# Patient Record
Sex: FEMALE | Race: WHITE | Employment: FULL TIME | URBAN - METROPOLITAN AREA
[De-identification: names, ages, dates, MRNs, and addresses within clinical notes are randomized per-mention and may not be internally consistent; named-entity substitution may affect disease eponyms.]

---

## 2017-03-30 ENCOUNTER — OFFICE VISIT (OUTPATIENT)
Dept: URGENT CARE | Facility: URGENT CARE | Age: 42
End: 2017-03-30
Payer: COMMERCIAL

## 2017-03-30 VITALS
HEIGHT: 67 IN | WEIGHT: 240.5 LBS | HEART RATE: 90 BPM | OXYGEN SATURATION: 96 % | BODY MASS INDEX: 37.75 KG/M2 | TEMPERATURE: 97.7 F | DIASTOLIC BLOOD PRESSURE: 89 MMHG | SYSTOLIC BLOOD PRESSURE: 141 MMHG

## 2017-03-30 DIAGNOSIS — H10.31 ACUTE BACTERIAL CONJUNCTIVITIS OF RIGHT EYE: Primary | ICD-10-CM

## 2017-03-30 PROCEDURE — 99213 OFFICE O/P EST LOW 20 MIN: CPT

## 2017-03-30 RX ORDER — POLYMYXIN B SULFATE AND TRIMETHOPRIM 1; 10000 MG/ML; [USP'U]/ML
1 SOLUTION OPHTHALMIC EVERY 4 HOURS
Qty: 1 BOTTLE | Refills: 0 | Status: SHIPPED | OUTPATIENT
Start: 2017-03-30 | End: 2017-04-06

## 2017-03-30 NOTE — MR AVS SNAPSHOT
"              After Visit Summary   3/30/2017    Natalee Underwood    MRN: [id number]           Patient Information     Date Of Birth          1975        Visit Information        Provider Department      3/30/2017 6:25 PM [facility] URGENT CARE [facility]        Today's Diagnoses     Acute bacterial conjunctivitis of right eye    -  1       Follow-ups after your visit        Follow-up notes from your care team     Return if symptoms worsen or fail to improve.      Who to contact     If you have questions or need follow up information about today's clinic visit or your schedule please contact [facility] directly at [phone].  Normal or non-critical lab and imaging results will be communicated to you by Layerhart, letter or phone within 4 business days after the clinic has received the results. If you do not hear from us within 7 days, please contact the clinic through Layerhart or phone. If you have a critical or abnormal lab result, we will notify you by phone as soon as possible.  Submit refill requests through Weeleo or call your pharmacy and they will forward the refill request to us. Please allow 3 business days for your refill to be completed.          Additional Information About Your Visit        MyChart Information     Weeleo gives you secure access to your electronic health record. If you see a primary care provider, you can also send messages to your care team and make appointments. If you have questions, please call your primary care clinic.  If you do not have a primary care provider, please call [phone] and they will assist you.        Care EveryWhere ID     This is your Care EveryWhere ID. This could be used by other organizations to access your [facility] medical records  [id number]        Your Vitals Were     Pulse Temperature Height Pulse Oximetry Breastfeeding? BMI (Body Mass Index)    90 97.7  F (36.5  C) (Oral) 5' 6.5\" (1.689 m) 96% No " 38.24 kg/m2       Blood Pressure from Last 3 Encounters:   03/30/17 141/89   10/17/16 116/72   01/13/15 112/76    Weight from Last 3 Encounters:   03/30/17 240 lb 8 oz (109.1 kg)   10/17/16 216 lb (98 kg)   01/13/15 215 lb 12.8 oz (97.9 kg)              Today, you had the following     No orders found for display         Today's Medication Changes          These changes are accurate as of: 3/30/17  7:03 PM.  If you have any questions, ask your nurse or doctor.               Start taking these medicines.        Dose/Directions    trimethoprim-polymyxin b ophthalmic solution   Commonly known as:  POLYTRIM   Used for:  Acute bacterial conjunctivitis of right eye        Dose:  1 drop   Apply 1 drop to eye every 4 hours for 7 days   Quantity:  1 Bottle   Refills:  0            Where to get your medicines      These medications were sent to [facility] [address] [place] [address] & [address]  [address] [address]     Phone:  [phone]     trimethoprim-polymyxin b ophthalmic solution                Primary Care Provider Office Phone # Fax #    Gerald Sommers [phone]       [facility] [address] [place] [address]        Thank you!     Thank you for choosing [facility] URGENT [facility]  for your care. Our goal is always to provide you with excellent care. Hearing back from our patients is one way we can continue to improve our services. Please take a few minutes to complete the written survey that you may receive in the mail after your visit with us. Thank you!             Your Updated Medication List - Protect others around you: Learn how to safely use, store and throw away your medicines at www.disposemymeds.org.          This list is accurate as of: 3/30/17  7:03 PM.  Always use your most recent med list.                   Brand Name Dispense Instructions for use    desoximetasone 0.25 % cream    TOPICORT    30 g     Apply to affected skin sparingly twice a day as needed for rash       GAS-X EXTRA STRENGTH 125 MG Caps   Generic drug:  Simethicone      AS NEEDED       ibuprofen 200 MG tablet    ADVIL/MOTRIN     AS NEEDED       MULTIVITAMIN PO      ONE DAILY       trimethoprim-polymyxin b ophthalmic solution    POLYTRIM    1 Bottle    Apply 1 drop to eye every 4 hours for 7 days       TUMS PO      2 DAILY

## 2017-03-30 NOTE — NURSING NOTE
"Chief Complaint   Patient presents with     Urgent Care     Conjunctivitis     noticed yesterday woke up with crust in R eye, creamy discharge coming from the eye throughout the day, eye pink in color, put eyedrops in eye about 2 hours patient wears contact lenses       Initial /89 (BP Location: Right arm, Patient Position: Chair, Cuff Size: Adult Regular)  Pulse 90  Temp 97.7  F (36.5  C) (Oral)  Ht 5' 6.5\" (1.689 m)  Wt 240 lb 8 oz (109.1 kg)  SpO2 96%  Breastfeeding? No  BMI 38.24 kg/m2 Estimated body mass index is 38.24 kg/(m^2) as calculated from the following:    Height as of this encounter: 5' 6.5\" (1.689 m).    Weight as of this encounter: 240 lb 8 oz (109.1 kg).  Medication Reconciliation: complete     LISA Heart      "

## 2017-03-31 NOTE — PROGRESS NOTES
"SUBJECTIVE:  Chief Complaint:   Chief Complaint   Patient presents with     Urgent Care     Conjunctivitis     noticed yesterday woke up with crust in R eye, creamy discharge coming from the eye throughout the day, eye pink in color, put eyedrops in eye about 2 hours patient wears contact lenses     History of Present Illness:  Natalee Underwood is a 42 year old female who presents complaining of moderate right eye discharge, mattering, redness for 1 day(s).   Associated Signs and Symptoms: cough and nasal drainage  Treatment measures tried include: none  Contact wearer : Yes     Past Medical History:   Diagnosis Date     Allergic rhinitis, cause unspecified      Dermatitis      Pneumonia, organism unspecified      Vitamin D deficiency disease 8/13/2013     Current Outpatient Prescriptions   Medication Sig Dispense Refill     trimethoprim-polymyxin b (POLYTRIM) ophthalmic solution Apply 1 drop to eye every 4 hours for 7 days 1 Bottle 0     desoximetasone (TOPICORT) 0.25 % cream Apply to affected skin sparingly twice a day as needed for rash 30 g 1     MULTIVITAMIN OR ONE DAILY       GAS-X EXTRA STRENGTH 125 MG OR CAPS AS NEEDED       IBUPROFEN 200 MG OR TABS  AS NEEDED       TUMS OR 2 DAILY          ROS:  Review of systems negative except as stated above.    OBJECTIVE:  /89 (BP Location: Right arm, Patient Position: Chair, Cuff Size: Adult Regular)  Pulse 90  Temp 97.7  F (36.5  C) (Oral)  Ht 5' 6.5\" (1.689 m)  Wt 240 lb 8 oz (109.1 kg)  SpO2 96%  Breastfeeding? No  BMI 38.24 kg/m2  General: no acute distress  Eye exam: left eye normal lid, conjunctiva, cornea, pupil and fundus, right eye abnormal findings: conjunctivitis with erythema, discharge and matting noted.    ASSESSMENT:  Bacterial Conjunctivitis    PLAN:  Warm packs for comfort. Polytrim ophthalmic drops-1-2 drops in the affected eye(s) every 4 hours while awake for 3 days.  See orders in epic    "

## 2017-06-02 ENCOUNTER — OFFICE VISIT (OUTPATIENT)
Dept: INTERNAL MEDICINE | Facility: CLINIC | Age: 42
End: 2017-06-02
Payer: COMMERCIAL

## 2017-06-02 VITALS
BODY MASS INDEX: 38.25 KG/M2 | HEIGHT: 67 IN | SYSTOLIC BLOOD PRESSURE: 124 MMHG | DIASTOLIC BLOOD PRESSURE: 80 MMHG | WEIGHT: 243.7 LBS | HEART RATE: 85 BPM | TEMPERATURE: 99.2 F | OXYGEN SATURATION: 98 %

## 2017-06-02 DIAGNOSIS — M54.2 NECK PAIN: ICD-10-CM

## 2017-06-02 DIAGNOSIS — M72.2 PLANTAR FASCIITIS: ICD-10-CM

## 2017-06-02 DIAGNOSIS — L74.0 HEAT RASH: ICD-10-CM

## 2017-06-02 DIAGNOSIS — M25.471 SWOLLEN R ANKLE: Primary | ICD-10-CM

## 2017-06-02 LAB
ALBUMIN SERPL-MCNC: 3.3 G/DL (ref 3.4–5)
ALP SERPL-CCNC: 68 U/L (ref 40–150)
ALT SERPL W P-5'-P-CCNC: 21 U/L (ref 0–50)
ANION GAP SERPL CALCULATED.3IONS-SCNC: 9 MMOL/L (ref 3–14)
AST SERPL W P-5'-P-CCNC: 19 U/L (ref 0–45)
BILIRUB SERPL-MCNC: 0.1 MG/DL (ref 0.2–1.3)
BUN SERPL-MCNC: 15 MG/DL (ref 7–30)
CALCIUM SERPL-MCNC: 9.2 MG/DL (ref 8.5–10.1)
CHLORIDE SERPL-SCNC: 106 MMOL/L (ref 94–109)
CO2 SERPL-SCNC: 24 MMOL/L (ref 20–32)
CREAT SERPL-MCNC: 0.65 MG/DL (ref 0.52–1.04)
D DIMER PPP FEU-MCNC: 0.5 UG/ML FEU (ref 0–0.5)
ERYTHROCYTE [DISTWIDTH] IN BLOOD BY AUTOMATED COUNT: 13.5 % (ref 10–15)
GFR SERPL CREATININE-BSD FRML MDRD: ABNORMAL ML/MIN/1.7M2
GLUCOSE SERPL-MCNC: 95 MG/DL (ref 70–99)
HCT VFR BLD AUTO: 40.3 % (ref 35–47)
HGB BLD-MCNC: 12.8 G/DL (ref 11.7–15.7)
MCH RBC QN AUTO: 29.5 PG (ref 26.5–33)
MCHC RBC AUTO-ENTMCNC: 31.8 G/DL (ref 31.5–36.5)
MCV RBC AUTO: 93 FL (ref 78–100)
PLATELET # BLD AUTO: 257 10E9/L (ref 150–450)
POTASSIUM SERPL-SCNC: 4.2 MMOL/L (ref 3.4–5.3)
PROT SERPL-MCNC: 7.4 G/DL (ref 6.8–8.8)
RBC # BLD AUTO: 4.34 10E12/L (ref 3.8–5.2)
SODIUM SERPL-SCNC: 139 MMOL/L (ref 133–144)
WBC # BLD AUTO: 9.3 10E9/L (ref 4–11)

## 2017-06-02 PROCEDURE — 85379 FIBRIN DEGRADATION QUANT: CPT | Performed by: INTERNAL MEDICINE

## 2017-06-02 PROCEDURE — 36415 COLL VENOUS BLD VENIPUNCTURE: CPT | Performed by: INTERNAL MEDICINE

## 2017-06-02 PROCEDURE — 85027 COMPLETE CBC AUTOMATED: CPT | Performed by: INTERNAL MEDICINE

## 2017-06-02 PROCEDURE — 99214 OFFICE O/P EST MOD 30 MIN: CPT | Performed by: INTERNAL MEDICINE

## 2017-06-02 PROCEDURE — 80053 COMPREHEN METABOLIC PANEL: CPT | Performed by: INTERNAL MEDICINE

## 2017-06-02 RX ORDER — DESOXIMETASONE 2.5 MG/G
CREAM TOPICAL
Qty: 15 G | Refills: 3 | Status: SHIPPED | OUTPATIENT
Start: 2017-06-02 | End: 2017-08-28

## 2017-06-02 NOTE — NURSING NOTE
"Chief Complaint   Patient presents with     Musculoskeletal Problem     x 1 month. Swelling and sharp pain on the R foot.       Initial /80 (BP Location: Left arm, Patient Position: Chair, Cuff Size: Adult Regular)  Pulse 85  Temp 99.2  F (37.3  C) (Oral)  Ht 5' 6.5\" (1.689 m)  Wt 243 lb 11.2 oz (110.5 kg)  LMP 05/12/2017 (Approximate)  SpO2 98%  Breastfeeding? No  BMI 38.74 kg/m2 Estimated body mass index is 38.74 kg/(m^2) as calculated from the following:    Height as of this encounter: 5' 6.5\" (1.689 m).    Weight as of this encounter: 243 lb 11.2 oz (110.5 kg).  Medication Reconciliation: complete       Kaminibose MA      "

## 2017-06-02 NOTE — MR AVS SNAPSHOT
After Visit Summary   6/2/2017    Natalee Underwood    MRN: [id number]           Patient Information     Date Of Birth          1975        Visit Information        Provider Department      6/2/2017 3:30 PM Mary Carmen Arrington MD [facility]        Today's Diagnoses     Heat rash    -  1    Neck pain        Plantar fasciitis        Swollen R ankle          Care Instructions    Labs - please proceed to our first floor laboratory to have these drawn (show them your orange ticket).     Results:    If normal: we will release results in MyChart or send them in the mail. You will not be called for these results.    If abnormal, but non-urgent: we will release results in MyChart or send them in the mail. You will not be called for these results.    If abnormal and urgent: we will call you.    ---    You will be contacted by physical therapy to schedule an appointment for neck pain and plantar fasciitis.                  Follow-ups after your visit        Additional Services     [facility] PT, HAND, AND CHIROPRACTIC REFERRAL       **This order will print in the [facility] Scheduling Office**    Physical Therapy, Hand Therapy and Chiropractic Care are available through:    *[facility]  *[facility]  *[facility]    Call one number to schedule at any of the above locations: [phone].    Your provider has referred you to: Physical Therapy at [facility] or [facility]    Indication/Reason for Referral: Neck Pain and foot pain   Onset of Illness: none  Therapy Orders: Evaluate and Treat  Special Programs: None  Special Request: None    Pavan Browne      Additional Comments for the Therapist or Chiropractor: none    Please be aware that coverage of these services is subject to the terms and limitations of your health insurance plan.  Call member services at your health plan with any benefit or coverage questions.      Please bring the following to  "your appointment:    *Your personal calendar for scheduling future appointments  *Comfortable clothing                  Who to contact     If you have questions or need follow up information about today's clinic visit or your schedule please contact [facility] directly at [phone].  Normal or non-critical lab and imaging results will be communicated to you by MyChart, letter or phone within 4 business days after the clinic has received the results. If you do not hear from us within 7 days, please contact the clinic through Displairhart or phone. If you have a critical or abnormal lab result, we will notify you by phone as soon as possible.  Submit refill requests through CrowdGather or call your pharmacy and they will forward the refill request to us. Please allow 3 business days for your refill to be completed.          Additional Information About Your Visit        MyChart Information     CrowdGather gives you secure access to your electronic health record. If you see a primary care provider, you can also send messages to your care team and make appointments. If you have questions, please call your primary care clinic.  If you do not have a primary care provider, please call [phone] and they will assist you.        Care EveryWhere ID     This is your Care EveryWhere ID. This could be used by other organizations to access your [facility] medical records  [id number]        Your Vitals Were     Pulse Temperature Height Last Period Pulse Oximetry Breastfeeding?    85 99.2  F (37.3  C) (Oral) 5' 6.5\" (1.689 m) 05/12/2017 (Approximate) 98% No    BMI (Body Mass Index)                   38.74 kg/m2            Blood Pressure from Last 3 Encounters:   06/02/17 124/80   03/30/17 141/89   10/17/16 116/72    Weight from Last 3 Encounters:   06/02/17 243 lb 11.2 oz (110.5 kg)   03/30/17 240 lb 8 oz (109.1 kg)   10/17/16 216 lb (98 kg)              We Performed the Following     CBC with platelets     " Comprehensive metabolic panel     D dimer, quantitative     [facility], HAND, AND CHIROPRACTIC REFERRAL          Where to get your medicines      These medications were sent to [facility] [address] [place] [address] [address]  [address] [facility] [address]     Phone:  [phone]     desoximetasone 0.25 % cream          Primary Care Provider Office Phone # Fax #    Gerald Sommers [phone]       [facility] [address]  [facility] [address]        Thank you!     Thank you for choosing [facility]  for your care. Our goal is always to provide you with excellent care. Hearing back from our patients is one way we can continue to improve our services. Please take a few minutes to complete the written survey that you may receive in the mail after your visit with us. Thank you!             Your Updated Medication List - Protect others around you: Learn how to safely use, store and throw away your medicines at www.disposemymeds.org.          This list is accurate as of: 6/2/17  4:03 PM.  Always use your most recent med list.                   Brand Name Dispense Instructions for use    desoximetasone 0.25 % cream    TOPICORT    15 g    Apply to affected skin sparingly twice a day as needed for rash       GAS-X EXTRA STRENGTH 125 MG Caps   Generic drug:  Simethicone      AS NEEDED       ibuprofen 200 MG tablet    ADVIL/MOTRIN     AS NEEDED       MULTIVITAMIN PO      ONE DAILY       TUMS PO      2 DAILY

## 2017-06-02 NOTE — PROGRESS NOTES
"  SUBJECTIVE:                                                      HPI: Natalee Underwood is a pleasant 42 year old female who presents with right ankle swelling:    - started ~1 month ago  - no precipitating incident (trauma, misstep, unusual exertion)  - no associated redness, skin changes, or pain  - stable throughout the day (not worse at night)    - no fevers or chills  - no shortness of breath or cough  - no chest pain or palpitations    - no recent immobilization, hospitalization, or surgeries  - no prior history of right ankle/foot injuries, procedures, or surgeries    PMH significant for right-sided plantar fasciitis  - ongoing for ~8-10 months now  - has been wearing special shoes and performing stretching exercises regularly to no avail  - has also been using NSAIDs for pain with moderate relief    PMH also significant for chronic, bilateral, posterior neck pain  - ongoing for years  - no precipitating trauma or unusual exertion  - dull and achy  - no upper extremity numbness, tingling, or weakness  - pain improves with massage and NSAIDs    Between the chronic neck pain and right-sided plantar fasciitis, she is using ibuprofen, 1-2 tabs, 4x/day chronically.    PMH also significant for obesity.    The medication, allergy, and problem lists have been reviewed and updated as appropriate.     Patient needs refill of topical steroid cream for heat rash.       OBJECTIVE:                                                      /80 (BP Location: Left arm, Patient Position: Chair, Cuff Size: Adult Regular)  Pulse 85  Temp 99.2  F (37.3  C) (Oral)  Ht 5' 6.5\" (1.689 m)  Wt 243 lb 11.2 oz (110.5 kg)  LMP 05/12/2017 (Approximate)  SpO2 98%  Breastfeeding? No  BMI 38.74 kg/m2  Constitutional: well-appearing  Respiratory: normal respiratory effort; clear to auscultation bilaterally  Cardiovascular: regular rate and rhythm; mild non-pitting edema right ankle and dorsum of foot - no redness, skin changes, " or tenderness to palpation  Musculoskeletal: normal gait and station; right ankle with normal range of motion  Psych: normal judgment and insight; normal mood and affect; recent and remote memory intact      ASSESSMENT/PLAN:                                                      (M25.471) Swollen R ankle  (primary encounter diagnosis)  Comment:   - etiology unclear.   - differential includes venous insufficiency (due to weight), DVT, and kidney/liver dysfunction.   - chronic NSAID use may be contributing to fluid retention.    - low suspicion for injury - no trauma, no pain, and normal ROM.  Plan:    - CBC, CMP, and D-dimer.   - cut back/try to eliminate NSAID use.   - if labs unrevealing, recommend leg elevation when able and compression stocking.    (M72.2) Plantar fasciitis  Comment:    - ongoing for 8-10 months.   - no significant improvement with supportive shoes and stretches.  Plan: may benefit from PT evaluation and treatment - referral placed.    (M54.2) Neck pain  Comment: chronic in nature.   Plan: referred to PT for further evaluation and treatment.     (L74.0) Heat rash  Plan: refill of Topicort provided.     The instructions on the AVS were discussed and explained to the patient. Patient expressed understanding of instructions.    Mary Carmen Arrington MD   [place] [address] [place] [address]  T: [phone], F: [phone]

## 2017-06-02 NOTE — PATIENT INSTRUCTIONS
Labs - please proceed to our first floor laboratory to have these drawn (show them your orange ticket).     Results:    If normal: we will release results in MyChart or send them in the mail. You will not be called for these results.    If abnormal, but non-urgent: we will release results in MyChart or send them in the mail. You will not be called for these results.    If abnormal and urgent: we will call you.    ---    You will be contacted by physical therapy to schedule an appointment for neck pain and plantar fasciitis.

## 2017-06-06 ENCOUNTER — THERAPY VISIT (OUTPATIENT)
Dept: PHYSICAL THERAPY | Facility: CLINIC | Age: 42
End: 2017-06-06
Payer: COMMERCIAL

## 2017-06-06 DIAGNOSIS — M72.2 PLANTAR FASCIITIS, RIGHT: Primary | ICD-10-CM

## 2017-06-06 PROCEDURE — 97110 THERAPEUTIC EXERCISES: CPT | Mod: GP | Performed by: PHYSICAL THERAPIST

## 2017-06-06 PROCEDURE — 97161 PT EVAL LOW COMPLEX 20 MIN: CPT | Mod: GP | Performed by: PHYSICAL THERAPIST

## 2017-06-06 NOTE — PROGRESS NOTES
"[facility] Initial Evaluation    Subjective:    Patient is a 42 year old female presenting with rehab right ankle/foot hpi.   Natalee Underwood is a 42 year old female with a right foot condition.  Condition occurred with:  Insidious onset.  Condition occurred: for unknown reasons.  This is a new condition  Onset of right heel pain approximately August 2016 for unknown reasons. She does recall being at a campground and was taking showers on hard mats and noted the bottom of her foot bruised and sore. Saw podiatrist who suggested insoles, good shoes, and stretching. Sx's have been 50% improved since then. Currently complains of pain in heel with first steps in a.m. or after prolonged sitting, walking greater than 15 minutes, and driving (resting heel in car gets painful after 30 minutes). Also notes randomly throughout day while sitting gets ache in bottom of right heel and \"zinging pain\" at medial heel. Occasionally limps when painful. History of multiple bilateral ankle sprains.    Patient reports pain:  Medial calcaneal tuberosity, medial and posterior.    Pain is described as aching and stabbing and is intermittent and reported as 4/10 and 7/10.   Pain is worse in the A.M..  Symptoms are exacerbated by standing and walking and relieved by rest.  Since onset symptoms are unchanged.        General health as reported by patient is good.                  Barriers include:  None as reported by patient.    Red flags:  None as reported by patient.                        Objective:    System    Ankle/Foot Evaluation  ROM:    AROM:    Dorsiflexion:  Left:   10  Right:   8  Plantarflexion:  Left:  68    Right:  68        Great Toe Extension:  Left:  45     Right: 45    Strength is normal.  LIGAMENT TESTING:   Anterior Drawer (ATF) Left: neg       Varus Stress (Calc Fib) Left: neg    Varus Stress (Calc Fib) Right: neg  Valgus Stress (Deltoid) Left: neg    Valgus Stress (Deltoid) Right: " trace        PALPATION: Palpation of ankle: also TTP to medial heel.    Right ankle tenderness present at:   medial calcaneal  Right ankle tenderness not present at:  gastroc/soleus or achilles tendon  EDEMA:     Right ankle edema present at:  medial and lateral        MOBILITY TESTING:             First Ray Left: hypomobile    First Ray Right: hypomobile                                                    General     ROS    Assessment/Plan:      Patient is a 42 year old female with right side ankle complaints.  Signs and sx's consistent with plantar fasciitis.   Patient has the following significant findings with corresponding treatment plan.                Diagnosis 1:  R plantar fasciitis  Pain -  US, manual therapy and home program  Decreased ROM/flexibility - manual therapy and therapeutic exercise  Decreased joint mobility - manual therapy and therapeutic exercise  Edema - self management/home program  Decreased function - therapeutic activities    Therapy Evaluation Codes:   1) History comprised of:   Personal factors that impact the plan of care:      None.    Comorbidity factors that impact the plan of care are:      None.     Medications impacting care: None.  2) Examination of Body Systems comprised of:   Body structures and functions that impact the plan of care:      Ankle.   Activity limitations that impact the plan of care are:      Driving, Standing and Walking.  3) Clinical presentation characteristics are:   Stable/Uncomplicated.  4) Decision-Making    Low complexity using standardized patient assessment instrument and/or measureable assessment of functional outcome.  Cumulative Therapy Evaluation is: Low complexity.    Previous and current functional limitations:  (See Goal Flow Sheet for this information)    Short term and Long term goals: (See Goal Flow Sheet for this information)     Communication ability:  Patient appears to be able to clearly communicate and understand verbal and written  communication and follow directions correctly.  Treatment Explanation - The following has been discussed with the patient:   RX ordered/plan of care  Anticipated outcomes  Possible risks and side effects  This patient would benefit from PT intervention to resume normal activities.   Rehab potential is good.    Frequency:  1 X week, once daily  Duration:  for 6 weeks  Discharge Plan:  Achieve all LTG.  Independent in home treatment program.  Reach maximal therapeutic benefit.    Please refer to the daily flowsheet for treatment today, total treatment time and time spent performing 1:1 timed codes.

## 2017-06-07 NOTE — PROGRESS NOTES
Subjective:    Patient is a 42 year old female presenting with rehab left ankle/foot hpi.                                      Pertinent medical history includes:  Osteoarthritis and overweight.    Other surgeries include:  Other (Dental).  Current medications:  Pain medication.  Current occupation is [employer].    Primary job tasks include:  Prolonged sitting and other (computer work).                                Objective:    System    Physical Exam    General     ROS    Assessment/Plan:

## 2017-06-15 ENCOUNTER — THERAPY VISIT (OUTPATIENT)
Dept: PHYSICAL THERAPY | Facility: CLINIC | Age: 42
End: 2017-06-15
Payer: COMMERCIAL

## 2017-06-15 DIAGNOSIS — M72.2 PLANTAR FASCIITIS, RIGHT: ICD-10-CM

## 2017-06-15 PROCEDURE — 97530 THERAPEUTIC ACTIVITIES: CPT | Mod: GP | Performed by: PHYSICAL THERAPIST

## 2017-06-15 PROCEDURE — 97110 THERAPEUTIC EXERCISES: CPT | Mod: GP | Performed by: PHYSICAL THERAPIST

## 2017-06-29 ENCOUNTER — THERAPY VISIT (OUTPATIENT)
Dept: PHYSICAL THERAPY | Facility: CLINIC | Age: 42
End: 2017-06-29
Payer: COMMERCIAL

## 2017-06-29 DIAGNOSIS — M72.2 PLANTAR FASCIITIS, RIGHT: ICD-10-CM

## 2017-06-29 PROCEDURE — 97110 THERAPEUTIC EXERCISES: CPT | Mod: GP | Performed by: PHYSICAL THERAPIST

## 2017-06-29 PROCEDURE — 97140 MANUAL THERAPY 1/> REGIONS: CPT | Mod: GP | Performed by: PHYSICAL THERAPIST

## 2017-07-06 ENCOUNTER — THERAPY VISIT (OUTPATIENT)
Dept: PHYSICAL THERAPY | Facility: CLINIC | Age: 42
End: 2017-07-06
Payer: COMMERCIAL

## 2017-07-06 DIAGNOSIS — M72.2 PLANTAR FASCIITIS, RIGHT: ICD-10-CM

## 2017-07-06 PROCEDURE — 97140 MANUAL THERAPY 1/> REGIONS: CPT | Mod: GP | Performed by: PHYSICAL THERAPIST

## 2017-07-06 PROCEDURE — 97035 APP MDLTY 1+ULTRASOUND EA 15: CPT | Mod: GP | Performed by: PHYSICAL THERAPIST

## 2017-07-06 PROCEDURE — 97110 THERAPEUTIC EXERCISES: CPT | Mod: GP | Performed by: PHYSICAL THERAPIST

## 2017-07-13 ENCOUNTER — THERAPY VISIT (OUTPATIENT)
Dept: PHYSICAL THERAPY | Facility: CLINIC | Age: 42
End: 2017-07-13
Payer: COMMERCIAL

## 2017-07-13 DIAGNOSIS — M54.2 CERVICALGIA: Primary | ICD-10-CM

## 2017-07-13 PROCEDURE — 97161 PT EVAL LOW COMPLEX 20 MIN: CPT | Mod: GP | Performed by: PHYSICAL THERAPIST

## 2017-07-13 PROCEDURE — 97110 THERAPEUTIC EXERCISES: CPT | Mod: GP | Performed by: PHYSICAL THERAPIST

## 2017-07-13 PROCEDURE — 97112 NEUROMUSCULAR REEDUCATION: CPT | Mod: GP | Performed by: PHYSICAL THERAPIST

## 2017-07-13 NOTE — MR AVS SNAPSHOT
After Visit Summary   7/13/2017    Natalee Underwood    MRN: [id number]           Patient Information     Date Of Birth          1975        Visit Information        Provider Department      7/13/2017 5:20 PM Jimena Solano PT [facility] Physical Therapy        Today's Diagnoses     Cervicalgia    -  1       Follow-ups after your visit        Your next 10 appointments already scheduled     Jul 20, 2017  4:40 PM CDT   JACQUI Extremity with Jimena Solano PT   [facility] ([facility]  )    [address]  [facility] [address]   [phone]            Aug 03, 2017  5:20 PM CDT   JACQUI Extremity with Jimena Solano PT   [facility] ([facility]  )    [address]  [facility] [id number] [phone]              Who to contact     If you have questions or need follow up information about today's clinic visit or your schedule please contact [facility] PHYSICAL THERAPY directly at [phone].  Normal or non-critical lab and imaging results will be communicated to you by VoIPshield Systemshart, letter or phone within 4 business days after the clinic has received the results. If you do not hear from us within 7 days, please contact the clinic through VoIPshield Systemshart or phone. If you have a critical or abnormal lab result, we will notify you by phone as soon as possible.  Submit refill requests through SiTime or call your pharmacy and they will forward the refill request to us. Please allow 3 business days for your refill to be completed.          Additional Information About Your Visit        MyChart Information     SiTime gives you secure access to your electronic health record. If you see a primary care provider, you can also send messages to your care team and make appointments. If you have questions,  please call your primary care clinic.  If you do not have a primary care provider, please call [phone] and they will assist you.        Care EveryWhere ID     This is your Care EveryWhere ID. This could be used by other organizations to access your [place] medical records  [id number]         Blood Pressure from Last 3 Encounters:   06/02/17 124/80   03/30/17 141/89   10/17/16 116/72    Weight from Last 3 Encounters:   06/02/17 110.5 kg (243 lb 11.2 oz)   03/30/17 109.1 kg (240 lb 8 oz)   10/17/16 98 kg (216 lb)              We Performed the Following     HC PT EVAL, LOW COMPLEXITY     JACQUI INITIAL EVAL REPORT     NEUROMUSCULAR RE-EDUCATION     THERAPEUTIC EXERCISES        Primary Care Provider Office Phone # Fax #    Gerald Sommers [phone]       [facility] [address] [facility] [address]        Equal Access to Services     KATE MAYA : Hadii aad ku hadasho Soomaali, waaxda luqadaha, qaybta kaalmada adeegyada, waxay idiin haydeidren shant garcia . So [facility] [phone].    ATENCIÓN: Si essence hardin, tiene a lópez disposición servicios gratuitos de asistencia lingüística. Llame al [phone].    We comply with applicable federal civil rights laws and [place] laws. We do not discriminate on the basis of race, color, national origin, age, disability sex, sexual orientation or gender identity.            Thank you!     Thank you for choosing [facility] PHYSICAL THERAP[facility]  for your care. Our goal is always to provide you with excellent care. Hearing back from our patients is one way we can continue to improve our services. Please take a few minutes to complete the written survey that you may receive in the mail after your visit with us. Thank you!             Your Updated Medication List - Protect others around you: Learn how to safely use, store and throw away your medicines at www.disposemymeds.org.          This list is accurate as of:  7/13/17  6:17 PM.  Always use your most recent med list.                   Brand Name Dispense Instructions for use Diagnosis    desoximetasone 0.25 % cream    TOPICORT    15 g    Apply to affected skin sparingly twice a day as needed for rash    Heat rash       GAS-X EXTRA STRENGTH 125 MG Caps   Generic drug:  Simethicone      AS NEEDED        ibuprofen 200 MG tablet    ADVIL/MOTRIN     AS NEEDED        MULTIVITAMIN PO      ONE DAILY        TUMS PO      2 DAILY

## 2017-07-13 NOTE — PROGRESS NOTES
[facility] Initial Evaluation    Subjective:    Patient is a 42 year old female presenting with rehab cervical spine hpi.   Natalee Underwood is a 42 year old female with a cervical spine condition.  Condition occurred with:  Repetition/overuse.  Condition occurred: at home and at work.  This is a new condition  Has had B neck pain over past year but since approximately May 2017 she has been doing a special project at work doing more deskwork/computer work and has had increased sx's. She takes aspirin several times per day for the neck pain. Has a good ergonomic station at work but has had to use a laptop on a desk recently which makes her worse. Also looking back and forth in a conference room is aggravating.    Patient reports pain:  Cervical right side and cervical left side.  Radiates to:  Shoulder right and shoulder left.  Pain is described as aching and burning and is intermittent and reported as 4/10.   Pain is worse in the P.M..  Symptoms are exacerbated by looking up or down and rotating head and relieved by NSAID's.  Since onset symptoms are unchanged.        General health as reported by patient is good.                  Barriers include:  None as reported by the patient.    Red flags:  None as reported by the patient.                        Objective:    Standing Alignment:    Cervical/Thoracic:  Forward head  Shoulder/UE:  Rounded shoulders                                  Cervical/Thoracic Evaluation    AROM:  AROM Cervical:    Flexion:            100% (pulling)  Extension:       75%  Rotation:         Left: 80%     Right: 80%  Side Bend:      Left: 90%     Right:  100%      Headaches: none  Cervical Myotomes:  not assessed                  DTR's:  not assessed          Cervical Dermatomes:  not assessed                                                                      Randy Cervical Evaluation    Posture:  Sitting: fair  Standing: fair  Protruding Head: yes  Wry Neck:  "no  Correction of Posture: no effect    Movement Loss:    Flexion (Flex): nil  Retraction (RET): min  Extension (EXT): min  Lateral Flexion Right (LF R): nil  Lateral Flexion Left (LF L): min  Rotation Right (ROT R): min  Rotation Left (ROT L): min  Test Movements:      RET: During: no effect    Repeat RET: During: no effect  After: better  Mechanical Response: IncROM  RET EXT: During: no effect    Repeat RET EXT: During: no effect  After: no effect  Mechanical Response: IncROM                      Static Tests:      Retraction: 45\"x2--NE    Conclusion: derangement  Principle of Treatment:  Posture Correction: yes    Extension: retraction x10, last 3 reps with extension                                             ROS    Assessment/Plan:      Patient is a 42 year old female with cervical complaints.  She has poor to fair seated posture with FH, rounded shoulders and Dowager's Hump. She had increased motion and slight decreased sx's with repeated cervical retraction. No change in sx's with posture correction. Trial of retr/ext and retr/ext/rot did not change motion or sx's further. Discussed posture and work station, break up sitting.     Patient has the following significant findings with corresponding treatment plan.                Diagnosis 1:  B neck pain  Pain -  directional preference exercise  Decreased ROM/flexibility - manual therapy and therapeutic exercise  Decreased function - therapeutic activities  Impaired posture - neuro re-education    Therapy Evaluation Codes:   1) History comprised of:   Personal factors that impact the plan of care:      None.    Comorbidity factors that impact the plan of care are:      None.     Medications impacting care: None.  2) Examination of Body Systems comprised of:   Body structures and functions that impact the plan of care:      Cervical spine.   Activity limitations that impact the plan of care are:      Reading/Computer work.  3) Clinical presentation characteristics " are:   Stable/Uncomplicated.  4) Decision-Making    Low complexity using standardized patient assessment instrument and/or measureable assessment of functional outcome.  Cumulative Therapy Evaluation is: Low complexity.    Previous and current functional limitations:  (See Goal Flow Sheet for this information)    Short term and Long term goals: (See Goal Flow Sheet for this information)     Communication ability:  Patient appears to be able to clearly communicate and understand verbal and written communication and follow directions correctly.  Treatment Explanation - The following has been discussed with the patient:   RX ordered/plan of care  Anticipated outcomes  Possible risks and side effects  This patient would benefit from PT intervention to resume normal activities.   Rehab potential is good.    Frequency:  1 X week, once daily  Duration:  for 6 weeks  Discharge Plan:  Achieve all LTG.  Independent in home treatment program.  Reach maximal therapeutic benefit.    Please refer to the daily flowsheet for treatment today, total treatment time and time spent performing 1:1 timed codes.

## 2017-07-20 ENCOUNTER — THERAPY VISIT (OUTPATIENT)
Dept: PHYSICAL THERAPY | Facility: CLINIC | Age: 42
End: 2017-07-20
Payer: COMMERCIAL

## 2017-07-20 DIAGNOSIS — M54.2 CERVICALGIA: ICD-10-CM

## 2017-07-20 PROCEDURE — 97110 THERAPEUTIC EXERCISES: CPT | Mod: GP | Performed by: PHYSICAL THERAPIST

## 2017-07-20 PROCEDURE — 97112 NEUROMUSCULAR REEDUCATION: CPT | Mod: GP | Performed by: PHYSICAL THERAPIST

## 2017-07-22 ENCOUNTER — HEALTH MAINTENANCE LETTER (OUTPATIENT)
Age: 42
End: 2017-07-22

## 2017-08-03 ENCOUNTER — THERAPY VISIT (OUTPATIENT)
Dept: PHYSICAL THERAPY | Facility: CLINIC | Age: 42
End: 2017-08-03
Payer: COMMERCIAL

## 2017-08-03 DIAGNOSIS — M54.2 CERVICALGIA: ICD-10-CM

## 2017-08-03 PROCEDURE — 97112 NEUROMUSCULAR REEDUCATION: CPT | Mod: GP | Performed by: PHYSICAL THERAPIST

## 2017-08-03 PROCEDURE — 97110 THERAPEUTIC EXERCISES: CPT | Mod: GP | Performed by: PHYSICAL THERAPIST

## 2017-08-10 ENCOUNTER — THERAPY VISIT (OUTPATIENT)
Dept: PHYSICAL THERAPY | Facility: CLINIC | Age: 42
End: 2017-08-10
Payer: COMMERCIAL

## 2017-08-10 DIAGNOSIS — M54.2 CERVICALGIA: ICD-10-CM

## 2017-08-10 PROCEDURE — 97110 THERAPEUTIC EXERCISES: CPT | Mod: GP | Performed by: PHYSICAL THERAPIST

## 2017-08-10 PROCEDURE — 97112 NEUROMUSCULAR REEDUCATION: CPT | Mod: GP | Performed by: PHYSICAL THERAPIST

## 2017-08-28 ENCOUNTER — OFFICE VISIT (OUTPATIENT)
Dept: INTERNAL MEDICINE | Facility: CLINIC | Age: 42
End: 2017-08-28
Payer: COMMERCIAL

## 2017-08-28 VITALS
DIASTOLIC BLOOD PRESSURE: 80 MMHG | OXYGEN SATURATION: 100 % | BODY MASS INDEX: 36.43 KG/M2 | HEART RATE: 90 BPM | TEMPERATURE: 99.1 F | HEIGHT: 67 IN | WEIGHT: 232.1 LBS | SYSTOLIC BLOOD PRESSURE: 118 MMHG

## 2017-08-28 DIAGNOSIS — Z12.4 SCREENING FOR CERVICAL CANCER: ICD-10-CM

## 2017-08-28 DIAGNOSIS — M79.641 BILATERAL HAND PAIN: ICD-10-CM

## 2017-08-28 DIAGNOSIS — Z00.01 ENCOUNTER FOR ROUTINE ADULT MEDICAL EXAM WITH ABNORMAL FINDINGS: Primary | ICD-10-CM

## 2017-08-28 DIAGNOSIS — Z71.84 TRAVEL ADVICE ENCOUNTER: ICD-10-CM

## 2017-08-28 DIAGNOSIS — M79.642 BILATERAL HAND PAIN: ICD-10-CM

## 2017-08-28 PROCEDURE — G0145 SCR C/V CYTO,THINLAYER,RESCR: HCPCS | Performed by: INTERNAL MEDICINE

## 2017-08-28 PROCEDURE — 87624 HPV HI-RISK TYP POOLED RSLT: CPT | Performed by: INTERNAL MEDICINE

## 2017-08-28 PROCEDURE — 99396 PREV VISIT EST AGE 40-64: CPT | Performed by: INTERNAL MEDICINE

## 2017-08-28 PROCEDURE — 99212 OFFICE O/P EST SF 10 MIN: CPT | Mod: 25 | Performed by: INTERNAL MEDICINE

## 2017-08-28 RX ORDER — CIPROFLOXACIN 500 MG/1
500 TABLET, FILM COATED ORAL 2 TIMES DAILY
Qty: 6 TABLET | Refills: 0 | Status: SHIPPED | OUTPATIENT
Start: 2017-08-28

## 2017-08-28 NOTE — PROGRESS NOTES
SUBJECTIVE:                                                      HPI: Natalee Underwood is a pleasant 42 year old female who presents for a physical.    She is planning on going to [place] in December. Will be staying with family and friends for 7-10 days.  - routine immunizations are up-to-date  - hepatitis A series is complete  - Typhoid vaccination is recommended  - she will not be in an area at risk for malaria  - patient made aware that she is at risk for his Zika virus while in [place]   - she is not pregnant, sexually active, or in relationship   - encouraged to wear insect repellent when outdoors while there  - patient not at risk for hepatitis B or rabies  - prescription for traveler's diarrhea recommended   - patient encouraged to avoid drinking tap water or eating undercooked food, and street fare    Patient also requests referral for hand therapy due to bilateral hand pain - chronic in nature.    ROS:  Constitutional: denies unintentional weight loss or gain; denies fevers, chills, or sweats     Cardiovascular: denies chest pain, palpitations, or edema  Respiratory: denies cough, wheezing, shortness of breath, or dyspnea on exertion  Gastrointestinal: denies nausea, vomiting, constipation, diarrhea, or abdominal pain  Genitourinary: denies urinary frequency, urgency, dysuria, or hematuria  Integumentary: denies rash or pruritus  Musculoskeletal: see above; denies back pain, muscle pain, joint pain, or joint swelling  Neurologic: denies focal weakness, numbness, or tingling  Hematologic/Immunologic: denies history of anemia or blood transfusions  Endocrine: denies heat or cold intolerance; denies polyuria, polydipsia  Psychiatric: denies anxiety; see preventative health below    Past Medical History:   Diagnosis Date     Obesity (BMI 30-39.9)      Past Surgical History:   Procedure Laterality Date     PLACEMENT OF DENTAL IMPLANT(S)      1 upper, 1 lower     Family History   Problem Relation Age of  "Onset     Rheumatologic Disease Mother      FM     Type 2 Diabetes Father      Myocardial Infarction Maternal Grandfather      later in life     Type 2 Diabetes Paternal Grandfather      Prostate Cancer Paternal Grandfather      CEREBROVASCULAR DISEASE No family hx of      Coronary Artery Disease Early Onset No family hx of      Colon Cancer No family hx of      Breast Cancer No family hx of      Ovarian Cancer No family hx of      Social History     Social History     Marital status: Single     Spouse name: N/A     Number of children: 0     Years of education: N/A     Occupational History     Configuration Management      Social History Main Topics     Smoking status: Never Smoker     Smokeless tobacco: Never Used     Alcohol use No     Drug use: No     Sexual activity: No      Comment: has never been sexually active     Social History Narrative    Single. Living with family.    No kids.     Walks most days; Aquafit once/week.      Allergies   Allergen Reactions     No Known Drug Allergies      Current Outpatient Prescriptions   Medication Sig     ASPIRIN PO Take 325 mg by mouth     MULTIVITAMIN OR ONE DAILY     TUMS OR 2 DAILY     Immunization History   Administered Date(s) Administered     TD (ADULT, 7+) 10/10/2002     TDAP Vaccine (Adacel) 08/27/2009     Twinrix A/B 12/09/2004, 01/11/2005, 06/30/2005     OBJECTIVE:                                                      /80  Pulse 90  Temp 99.1  F (37.3  C) (Oral)  Ht 5' 6.5\" (1.689 m)  Wt 232 lb 1.6 oz (105.3 kg)  LMP 08/20/2017  SpO2 100%  BMI 36.9 kg/m2  Constitutional: well-appearing  Eyes: normal conjunctivae and lids; pupils equal, round, and reactive to light  Ears, Nose, Mouth, and Throat: normal ears and nose; tympanic membranes visualized and normal; normal lips, teeth, and gums; no oropharyngeal lesions or ulcers  Neck: supple and symmetric; no lymphadenopathy; no thyromegaly or masses  Respiratory: normal respiratory effort; clear to " auscultation bilaterally  Cardiovascular: regular rate and rhythm; pedal pulses palpable; no edema  Breasts: normal appearance; no masses or skin retraction; no nipple discharge or bleeding; no axillary lymphadenopathy  Gastrointestinal: soft, non-tender, non-distended, and bowel sounds present; no organomegaly or masses  Genitourinary: external genitalia, urethral meatus, and vagina normal; cervix visualized and normal in appearance  Musculoskeletal: normal gait and station  Psych: normal judgment and insight; normal mood and affect; recent and remote memory intact; oriented to time, place, and person    PREVENTATIVE HEALTH                                                      Blood pressure: within normal limits   Mammogram: patient would like to start screening mammograms at age 46  Pap: DUE  Colonoscopy: not medically indicated at this time   Dexa: not medically indicated at this time   Screening HCV: not medically indicated at this time   Screening cholesterol: up to date and within normal limits (LDL: 106 , HDL: 48, T)   Screening diabetes: up to date and within normal limits (77, 3/7/17)  STD testing: no risk factors present  Depression screening: PHQ-2 assessment completed and reviewed - no intervention indicated at this time  Alcohol misuse screening: alcohol use reviewed - no intervention indicated at this time  Immunizations: reviewed; up to date     ASSESSMENT/PLAN:                                                       (Z00.01) Encounter for routine adult medical exam with abnormal findings  (primary encounter diagnosis)  Comment: PMH, PSH, FH, SH, medications, allergies, immunizations, and preventative health measures reviewed.   Plan: see below for plans    (Z12.4) Screening for cervical cancer  Plan: pap smear obtained today.    (Z71.89) Travel advice encounter  Comment:    - going to [place] in December for 7-10 days; will be staying with friends and family.    - immunizations, including Hep A,  up to date, except Typhoid.   - will not be at risk for malaria, Hep B, or rabies.   Plan:   - prescription for oral typhoid provided.   - prescription for Cipro provided.   - patient encouraged to wear insect repellent when outdoors in [place].    - patient encouraged to avoid drinking tap water or eating undercooked food, and street fare.    (M79.641,  M79.642) Bilateral hand pain  Comment: did not evaluate.   Plan: PT referral placed - patient will be contacted to schedule.    The instructions on the AVS were discussed and explained to the patient. Patient expressed understanding of instructions.    (Chart documentation was completed, in part, with hulu voice-recognition software. Even though reviewed, some grammatical, spelling, and word errors may remain.)    Mary Carmen Arrington MD   [address] [address]  T: [phone], F: [phone]

## 2017-08-28 NOTE — PATIENT INSTRUCTIONS
Pap smear results take ~ 1 week to come back.    ---    Printed scripts for ciprofloxacin and typhoid.    Start oral typhoid vaccine 2 weeks prior to travel, 1 capsule every other day × 4 doses.    In case of traveler's diarrhea: Ciprofloxacin 500 mg twice a day × 3 days.    ---    You have been referred to hand therapy - they will contact you to schedule.

## 2017-08-28 NOTE — MR AVS SNAPSHOT
After Visit Summary   8/28/2017    Natalee Underwood    MRN: [id number]           Patient Information     Date Of Birth          1975        Visit Information        Provider Department      8/28/2017 9:00 AM Mary Carmen Arrington MD [facility]        Today's Diagnoses     Travel advice encounter    -  1    Bilateral hand pain        Screening for cervical cancer          Care Instructions    Pap smear results take ~ 1 week to come back.    ---    Printed scripts for ciprofloxacin and typhoid.    Start oral typhoid vaccine 2 weeks prior to travel, 1 capsule every other day × 4 doses.    In case of traveler's diarrhea: Ciprofloxacin 500 mg twice a day × 3 days.    ---    You have been referred to hand therapy - they will contact you to schedule.          Follow-ups after your visit        Additional Services     [facility] PT, HAND, AND CHIROPRACTIC REFERRAL       **This order will print in the JACQUI Scheduling Office**    Physical Therapy, Hand Therapy and Chiropractic Care are available through:    *[facility]  *[facility]  *[facility]    Call one number to schedule at any of the above locations: [phone].    Your provider has referred you to: Hand Therapy    Indication/Reason for Referral: Hand Pain  Onset of Illness: chronic  Therapy Orders: Evaluate and Treat  Special Programs: None  Special Request: None    Pavan Browne      Additional Comments for the Therapist or Chiropractor: none    Please be aware that coverage of these services is subject to the terms and limitations of your health insurance plan.  Call member services at your health plan with any benefit or coverage questions.      Please bring the following to your appointment:    *Your personal calendar for scheduling future appointments  *Comfortable clothing                  Your next 10 appointments already scheduled     Sep 05, 2017  8:50 AM CDT  "  JACQUI Extremity with Jimena Solano PT   [facility] Therapy ([facility] [place]  )    [address] [place] [address] [phone]              Who to contact     If you have questions or need follow up information about today's clinic visit or your schedule please contact [facility] directly at [phone].  Normal or non-critical lab and imaging results will be communicated to you by Yasthart, letter or phone within 4 business days after the clinic has received the results. If you do not hear from us within 7 days, please contact the clinic through WOWasht or phone. If you have a critical or abnormal lab result, we will notify you by phone as soon as possible.  Submit refill requests through Sanergy or call your pharmacy and they will forward the refill request to us. Please allow 3 business days for your refill to be completed.          Additional Information About Your Visit        Sanergy Information     Sanergy gives you secure access to your electronic health record. If you see a primary care provider, you can also send messages to your care team and make appointments. If you have questions, please call your primary care clinic.  If you do not have a primary care provider, please call [phone] and they will assist you.        Care EveryWhere ID     This is your Care EveryWhere ID. This could be used by other organizations to access your [facility] medical records  [id number]        Your Vitals Were     Pulse Temperature Height Last Period Pulse Oximetry BMI (Body Mass Index)    90 99.1  F (37.3  C) (Oral) 5' 6.5\" (1.689 m) 08/20/2017 100% 36.9 kg/m2       Blood Pressure from Last 3 Encounters:   08/28/17 118/80   06/02/17 124/80   03/30/17 141/89    Weight from Last 3 Encounters:   08/28/17 232 lb 1.6 oz (105.3 kg)   06/02/17 243 lb 11.2 oz (110.5 kg)   03/30/17 240 lb 8 oz (109.1 kg)              We Performed the " Following     HPV High Risk Types DNA Cervical     [facility] PT, HAND, AND CHIROPRACTIC REFERRAL     Pap imaged thin layer screen with HPV - recommended age 30 - 65 years (select HPV order below)          Today's Medication Changes          These changes are accurate as of: 8/28/17  9:48 AM.  If you have any questions, ask your nurse or doctor.               Start taking these medicines.        Dose/Directions    ciprofloxacin 500 MG tablet   Commonly known as:  CIPRO   Used for:  Travel advice encounter   Started by:  Mary Carmen Arrington MD        Dose:  500 mg   Take 1 tablet (500 mg) by mouth 2 times daily   Quantity:  6 tablet   Refills:  0       typhoid CR capsule   Commonly known as:  VIVOTIF   Used for:  Travel advice encounter   Started by:  Mary Carmen Arrington MD        Dose:  1 capsule   Take 1 capsule by mouth every other day   Quantity:  4 capsule   Refills:  0            Where to get your medicines      Some of these will need a paper prescription and others can be bought over the counter.  Ask your nurse if you have questions.     Bring a paper prescription for each of these medications     ciprofloxacin 500 MG tablet    typhoid CR capsule                Primary Care Provider Office Phone # Fax #    Mary Carmen Arrington [phone]       [address] [facility] [address]        Equal Access to Services     GONSALO MAYA AH: Hadii rebel benitez hadasho Soomaali, waaxda luqadaha, qaybta kaalmada adeegyada, emelina terry. So [facility] [phone].    ATENCIÓN: Si habla español, tiene a lópez disposición servicios gratuitos de asistencia lingüística. Llame al [phone].    We comply with applicable federal civil rights laws and [place] laws. We do not discriminate on the basis of race, color, national origin, age, disability sex, sexual orientation or gender identity.            Thank you!     Thank you for choosing [facility]  for your care. Our goal is  always to provide you with excellent care. Hearing back from our patients is one way we can continue to improve our services. Please take a few minutes to complete the written survey that you may receive in the mail after your visit with us. Thank you!             Your Updated Medication List - Protect others around you: Learn how to safely use, store and throw away your medicines at www.disposemymeds.org.          This list is accurate as of: 8/28/17  9:48 AM.  Always use your most recent med list.                   Brand Name Dispense Instructions for use Diagnosis    ASPIRIN PO      Take 325 mg by mouth        ciprofloxacin 500 MG tablet    CIPRO    6 tablet    Take 1 tablet (500 mg) by mouth 2 times daily    Travel advice encounter       MULTIVITAMIN PO      ONE DAILY        TUMS PO      2 DAILY        typhoid CR capsule    VIVOTIF    4 capsule    Take 1 capsule by mouth every other day    Travel advice encounter

## 2017-08-28 NOTE — NURSING NOTE
"Chief Complaint   Patient presents with     Physical       Initial /80  Pulse 90  Temp 99.1  F (37.3  C) (Oral)  Ht 5' 6.5\" (1.689 m)  Wt 232 lb 1.6 oz (105.3 kg)  LMP 08/20/2017  SpO2 100%  BMI 36.9 kg/m2 Estimated body mass index is 36.9 kg/(m^2) as calculated from the following:    Height as of this encounter: 5' 6.5\" (1.689 m).    Weight as of this encounter: 232 lb 1.6 oz (105.3 kg).  Medication Reconciliation: complete   Angelica Tejeda MA   "

## 2017-08-30 LAB
COPATH REPORT: NORMAL
PAP: NORMAL

## 2017-08-31 LAB
FINAL DIAGNOSIS: NORMAL
HPV HR 12 DNA CVX QL NAA+PROBE: NEGATIVE
HPV16 DNA SPEC QL NAA+PROBE: NEGATIVE
HPV18 DNA SPEC QL NAA+PROBE: NEGATIVE
SPECIMEN DESCRIPTION: NORMAL

## 2017-09-05 ENCOUNTER — THERAPY VISIT (OUTPATIENT)
Dept: PHYSICAL THERAPY | Facility: CLINIC | Age: 42
End: 2017-09-05
Payer: COMMERCIAL

## 2017-09-05 DIAGNOSIS — M54.2 CERVICALGIA: ICD-10-CM

## 2017-09-05 DIAGNOSIS — M72.2 PLANTAR FASCIITIS, RIGHT: ICD-10-CM

## 2017-09-05 PROCEDURE — 97530 THERAPEUTIC ACTIVITIES: CPT | Mod: GP | Performed by: PHYSICAL THERAPIST

## 2017-09-05 PROCEDURE — 97112 NEUROMUSCULAR REEDUCATION: CPT | Mod: GP | Performed by: PHYSICAL THERAPIST

## 2017-09-05 PROCEDURE — 97110 THERAPEUTIC EXERCISES: CPT | Mod: GP | Performed by: PHYSICAL THERAPIST

## 2017-09-05 NOTE — PROGRESS NOTES
Subjective:    HPI                    Objective:    System    Physical Exam    General     ROS    Assessment/Plan:      DISCHARGE REPORT    Progress reporting period is from 6-6-17 (for foot/ankle; 7-13-17 for neck) to 9-5-17.   Pt seen for 5 visits for foot/ankle, 5 visits for neck.    SUBJECTIVE   Overall the neck is better, less pain and stiffness. Still takes aspirin as needed. Sitting at computer at work can still be bothersome but feels she can manage her sx's with the exercises. Retraction and retr/ext exercise is most helpful. Seems to be best if she can do a couple sets of 10 at a time. Her foot pain is slowly improving. The exercises are helpful and she feels ready to continue with these on her own. She would like to start going to the gym to workout and would like some guidance for this.    Current Pain level: 1/10.      Initial Pain level: 5/10.   Changes in function:  Yes (See Goal flowsheet attached for changes in current functional level)  Adverse reaction to treatment or activity: None    OBJECTIVE  Changes noted in objective findings:  Yes, improved motion, strength, posture, muscle control.  Objective: CAROM is 100% all directions. Able to demonstate good posture without cues. R ankle motion and strength are WNL. Gait is WNL. Has decreased B great toe extension. She will continue to work on that with her HEP. Discussed gym workouts in relation to her neck and foot issues.      ASSESSMENT/PLAN  Patient has a good home exercise program for her neck and foot. We discussed gym workouts, what to avoid, what to focus on in relation to her neck and foot issues. She feels she is managing her sx's with her home exercise program and is ready to continue on her own. She will call with any questions/problems.    Updated problem list and treatment plan: Diagnosis 1:  Neck pain    Diagnosis 2:  Foot/ankle pain     STG/LTGs have been met or progress has been made towards goals:  Yes (See Goal flow sheet completed  today.)  Assessment of Progress: The patient's condition is improving.  Patient is meeting short term goals and is progressing towards long term goals.  Self Management Plans:  Patient has been instructed in a home treatment program.  Patient  has been instructed in self management of symptoms.  I have re-evaluated this patient and find that the nature, scope, duration and intensity of the therapy is appropriate for the medical condition of the patient.  Natalee continues to require the following intervention to meet STG and LTG's:  PT intervention is no longer required to meet STG/LTG.    Recommendations:  This patient is ready to be discharged from therapy and continue their home treatment program.    Please refer to the daily flowsheet for treatment today, total treatment time and time spent performing 1:1 timed codes.

## 2017-09-05 NOTE — MR AVS SNAPSHOT
After Visit Summary   9/5/2017    Natalee Underwood    MRN: [id number]           Patient Information     Date Of Birth          1975        Visit Information        Provider Department      9/5/2017 8:50 AM Jimena Solano PT [facility] Physical Therapy        Today's Diagnoses     Cervicalgia        Plantar fasciitis, right           Follow-ups after your visit        Your next 10 appointments already scheduled     Sep 08, 2017  5:00 PM CDT   (Arrive by 4:45 PM)   [facility]I [facility] Cassandra Moran OT   [facility]    [address] [address]   [phone]            Sep 15, 2017  9:00 AM CDT   JACQUI Hand with Jessie Becker   [facility]    [address] [address]   [phone]            Sep 22, 2017  9:00 AM CDT   JACQUI Hand with Jessie Becker   [facility]    [address] [address]   [phone]              Who to contact     If you have questions or need follow up information about today's clinic visit or your schedule please contact [facility]ER[facility] directly at [phone].  Normal or non-critical lab and imaging results will be communicated to you by [employer], letter or phone within 4 business days after the clinic has received the results. If you do not hear from us within 7 days, please contact the clinic through TableApphart or phone. If you have a critical or abnormal lab result, we will notify you by phone as soon as possible.  Submit refill requests through [facility] [facility] or call your pharmacy and they will forward the refill request to us. Please allow 3 business days for your refill to be completed.          Additional Information About Your Visit        [employer] Information     [employer] [facility] gives you secure access to your electronic health  record. If you see a primary care provider, you can also send messages to your care team and make appointments. If you have questions, please call your primary care clinic.  If you do not have a primary care provider, please call [phone] and they will assist you.        Care EveryWhere ID     This is your Care EveryWhere ID. This could be used by other organizations to access your [place] medical records  [id number]        Your Vitals Were     Last Period                   08/20/2017            Blood Pressure from Last 3 Encounters:   08/28/17 118/80   06/02/17 124/80   03/30/17 141/89    Weight from Last 3 Encounters:   08/28/17 105.3 kg (232 lb 1.6 oz)   06/02/17 110.5 kg (243 lb 11.2 oz)   03/30/17 109.1 kg (240 lb 8 oz)              We Performed the Following     JACQUI PROGRESS NOTES REPORT     NEUROMUSCULAR RE-EDUCATION     THERAPEUTIC ACTIVITIES     THERAPEUTIC EXERCISES        Primary Care Provider Office Phone # Fax #    Mary Carmen Arrington [phone]       [address] [place] [address]        Equal Access to Services     [facility] : Hadii aad ku hadasho Soomaali, waaxda luqadaha, qaybta kaalmada abraham, emelina garcia . So [facility] [phone].    ATENCIÓN: Si habla español, tiene a lópez disposición servicios gratuitos de asistencia lingüística. Lucy al [phone].    We comply with applicable federal civil rights laws and [place] laws. We do not discriminate on the basis of race, color, national origin, age, disability sex, sexual orientation or gender identity.            Thank you!     Thank you for choosing [facility] PH[facility]ICAL THERAP[facility]  for your care. Our goal is always to provide you with excellent care. Hearing back from our patients is one way we can continue to improve our services. Please take a few minutes to complete the written survey that you may receive in the mail after your visit with us. Thank  you!             Your Updated Medication List - Protect others around you: Learn how to safely use, store and throw away your medicines at www.disposemymeds.org.          This list is accurate as of: 9/5/17 11:24 AM.  Always use your most recent med list.                   Brand Name Dispense Instructions for use Diagnosis    ASPIRIN PO      Take 325 mg by mouth        ciprofloxacin 500 MG tablet    CIPRO    6 tablet    Take 1 tablet (500 mg) by mouth 2 times daily    Travel advice encounter       MULTIVITAMIN PO      ONE DAILY        TUMS PO      2 DAILY        typhoid CR capsule    VIVOTIF    4 capsule    Take 1 capsule by mouth every other day    Travel advice encounter

## 2017-09-08 ENCOUNTER — THERAPY VISIT (OUTPATIENT)
Dept: OCCUPATIONAL THERAPY | Facility: CLINIC | Age: 42
End: 2017-09-08
Payer: COMMERCIAL

## 2017-09-08 DIAGNOSIS — M79.641 PAIN IN BOTH HANDS: Primary | ICD-10-CM

## 2017-09-08 DIAGNOSIS — M79.642 PAIN IN BOTH HANDS: Primary | ICD-10-CM

## 2017-09-08 PROCEDURE — 97110 THERAPEUTIC EXERCISES: CPT | Mod: GO | Performed by: OCCUPATIONAL THERAPIST

## 2017-09-08 PROCEDURE — 97535 SELF CARE MNGMENT TRAINING: CPT | Mod: GO | Performed by: OCCUPATIONAL THERAPIST

## 2017-09-08 PROCEDURE — 97165 OT EVAL LOW COMPLEX 30 MIN: CPT | Mod: GO | Performed by: OCCUPATIONAL THERAPIST

## 2017-09-08 NOTE — PROGRESS NOTES
Hand Therapy Initial Evaluation    Current Date:  9/8/2017    Diagnosis: bilateral hand discomfort       Subjective:  Natlaee Underwood is a 42 year old right hand dominant female reporting bilateral hand pain for a few years. Its better now that she has an ergonomic desk set up.    Patient reports symptoms of pain and stiffness/loss of motion of the bilateral hands which occurred due to unknown mostly when playing piano. Since onset symptoms are Unchanged  Special tests:  bone scan.  Previous treatment: none.    General health as reported by patient is good.  Pertinent medical history includes:osteoarthritis, overweight  Medical allergies:none.  Surgical history: none.  Medication history: anti-inflammatory, pain.    Occupational Profile Information:  Current occupation is   Currently working in normal job without restrictions  Job Tasks: prolonged sitting, repetitive tasks, computer work  Prior functional level:  no limitations  Barriers include:none  Mobility: No difficulty  Transportation: drives  Leisure activities/hobbies: playong piano, drums, and writing      Functional Outcome Measure:  UEFI 71/80.  Lower score indicates more diability    Objective:  Pain Level Report  VAS(0-10) 9/8/2017   At Rest: 0/10   With Use: 2/10     Report of Pain:  Location:  hand  Pain Quality:  Dull  Frequency: intermittent    Pain is worst:  After playing piano  Exacerbated by:  Piano playing, writing  Relieved by:  unknown  Progression:  unchanged  ROM:  full    Strength:        INVOLVED:  bilateral  Norm/low end of norm for age  +,++, +++  = Pain  Date  9/8/17     Right Left    55 lbs 46 lbs   Norm 70.4 lbs 62.3lbs       Edema:  NONE of affected part    Scar:  none    Sensation:  WNL throughout all nerve distributions; per patient report     Assessment/Plan:  Patient's limitations or Problem List includes:  Pain and stiffness of the bilateral hand which interferes with the patient's ability to  perform Recreational Activities  as compared to previous level of function.    Rehab Potential:  Excellent - Return to full activity, no limitations    Patient will benefit from skilled Occupational Therapy to decrease stiffness to return to previous activity level and resume normal daily tasks and to reach their rehab potential.    Barriers to Learning:  No barrier    Communication Issues:  Patient appears to be able to clearly communicate and understand verbal and written communication and follow directions correctly.    Chart Review: Brief history including review of medical and/or therapy records relating to the presenting problem    Identified Performance Deficits: playing piano and writing in journal    Assessment of Occupational Performance:  1-3 Performance Deficits    Clinical Decision Making (Complexity): Low complexity    Treatment Explanation:  The following has been discussed with the patient:  RX ordered/plan of care  Anticipated outcomes  Possible risks and side effects    Treatment Plan:    Frequency:  1 x visit  Duration:  NA; 1 x visit    Modalities:  Contrast baths  Therapeutic Exercise:  AROM, Tendon Gliding and Isotonics  Self Care:  Ergonomic Considerations  Discharge Plan:  Achieve all LTG.  Independent in home treatment program.  Reach maximal therapeutic benefit.    Home Exercise Program:  Intrinsic strengthening  Tendon gliding   Stretches  Edema gloves if needed  contrast baths

## 2018-03-07 ENCOUNTER — E-VISIT (OUTPATIENT)
Dept: INTERNAL MEDICINE | Facility: CLINIC | Age: 43
End: 2018-03-07
Payer: COMMERCIAL

## 2018-03-07 DIAGNOSIS — H10.022 PINK EYE DISEASE OF LEFT EYE: Primary | ICD-10-CM

## 2020-02-10 ENCOUNTER — HEALTH MAINTENANCE LETTER (OUTPATIENT)
Age: 45
End: 2020-02-10

## 2021-02-07 ENCOUNTER — HEALTH MAINTENANCE LETTER (OUTPATIENT)
Age: 46
End: 2021-02-07

## 2021-04-04 ENCOUNTER — HEALTH MAINTENANCE LETTER (OUTPATIENT)
Age: 46
End: 2021-04-04

## 2022-03-05 ENCOUNTER — HEALTH MAINTENANCE LETTER (OUTPATIENT)
Age: 47
End: 2022-03-05

## 2022-04-30 ENCOUNTER — HEALTH MAINTENANCE LETTER (OUTPATIENT)
Age: 47
End: 2022-04-30

## 2023-02-04 NOTE — MR AVS SNAPSHOT
After Visit Summary   6/6/2017    Natalee Underwood    MRN: [id number]           Patient Information     Date Of Birth          1975        Visit Information        Provider Department      6/6/2017 5:20 PM Jimena Solano PT [facility] Physical Therapy        Today's Diagnoses     Plantar fasciitis, right    -  1       Follow-ups after your visit        Your next 10 appointments already scheduled     Asher 15, 2017  3:20 PM CDT   JACQUI Extremity with Jimena Solano PT   [facility] Physical Therapy ([facility]  )    [address] [place] [address] [address]  [facility] [address]   [phone]            Jun 29, 2017  5:20 PM CDT   JACQUI Extremity with Jimena Solano PT   [facility] Therapy ([facility]  )    [address] [place] [address] [address]  [facility] [address]   [phone]            Jul 06, 2017  5:20 PM CDT   JACQUI Extremity with Jimena Solano PT   [facility]    [address] [place] [address] [address]  [facility] [address]   [phone]              Who to contact     If you have questions or need follow up information about today's clinic visit or your schedule please contact [facility] PHYSICAL THERAPY directly at [phone].  Normal or non-critical lab and imaging results will be communicated to you by MyChart, letter or phone within 4 business days after the clinic has received the results. If you do not hear from us within 7 days, please contact the clinic through MyChart or phone. If you have a critical or abnormal lab result, we will notify you by phone as soon as possible.  Submit refill requests through [facility] or call your pharmacy and they will forward the refill request to us. Please allow 3 business days for your refill to be completed.          Additional Information  About Your Visit        [employer] Information     Pager gives you secure access to your electronic health record. If you see a primary care provider, you can also send messages to your care team and make appointments. If you have questions, please call your primary care clinic.  If you do not have a primary care provider, please call [phone] and they will assist you.        Care EveryWhere ID     This is your Care EveryWhere ID. This could be used by other organizations to access your [place] medical records  [id number]        Your Vitals Were     Last Period                   05/12/2017 (Approximate)            Blood Pressure from Last 3 Encounters:   06/02/17 124/80   03/30/17 141/89   10/17/16 116/72    Weight from Last 3 Encounters:   06/02/17 110.5 kg (243 lb 11.2 oz)   03/30/17 109.1 kg (240 lb 8 oz)   10/17/16 98 kg (216 lb)              We Performed the Following     HC PT EVAL, LOW COMPLEXITY     JACQUI INITIAL EVAL REPORT     THERAPEUTIC EXERCISES        Primary Care Provider Office Phone # Fax #    Gerald Sommers [phone]       [facility] [address] [facility] [address]        Thank you!     Thank you for choosing [facility] PHYSICAL THERAP[facility]  for your care. Our goal is always to provide you with excellent care. Hearing back from our patients is one way we can continue to improve our services. Please take a few minutes to complete the written survey that you may receive in the mail after your visit with us. Thank you!             Your Updated Medication List - Protect others around you: Learn how to safely use, store and throw away your medicines at www.disposemymeds.org.          This list is accurate as of: 6/6/17  6:20 PM.  Always use your most recent med list.                   Brand Name Dispense Instructions for use    desoximetasone 0.25 % cream    TOPICORT    15 g    Apply to affected skin sparingly twice a day as needed for  rash       GAS-X EXTRA STRENGTH 125 MG Caps   Generic drug:  Simethicone      AS NEEDED       ibuprofen 200 MG tablet    ADVIL/MOTRIN     AS NEEDED       MULTIVITAMIN PO      ONE DAILY       TUMS PO      2 DAILY          Yes

## 2023-04-15 ENCOUNTER — HEALTH MAINTENANCE LETTER (OUTPATIENT)
Age: 48
End: 2023-04-15

## 2023-06-01 ENCOUNTER — HEALTH MAINTENANCE LETTER (OUTPATIENT)
Age: 48
End: 2023-06-01